# Patient Record
Sex: FEMALE | Race: WHITE | Employment: STUDENT | URBAN - METROPOLITAN AREA
[De-identification: names, ages, dates, MRNs, and addresses within clinical notes are randomized per-mention and may not be internally consistent; named-entity substitution may affect disease eponyms.]

---

## 2022-09-08 ENCOUNTER — OFFICE VISIT (OUTPATIENT)
Dept: ORTHOPEDIC SURGERY | Age: 22
End: 2022-09-08
Payer: COMMERCIAL

## 2022-09-08 DIAGNOSIS — M25.551 PAIN IN RIGHT HIP: Primary | ICD-10-CM

## 2022-09-08 PROCEDURE — 99213 OFFICE O/P EST LOW 20 MIN: CPT | Performed by: ORTHOPAEDIC SURGERY

## 2022-09-08 RX ORDER — SPIRONOLACTONE 25 MG/1
TABLET ORAL
COMMUNITY
Start: 2022-08-11

## 2022-09-08 NOTE — PROGRESS NOTES
Name: Calos Jung  YOB: 2000  Gender: female  MRN: [id number]      CC: Follow-up (Right hip)       HPI: Calos Jung is a 25 y.o. female who returns for follow up on the right hip CANDELARIA and labral pathology. She is back to school at [employer] after being home this summer. She admits that she did not do very much rehab over the summer but has started to work harder at this since coming back to school. She has no problems with the hip during her activities of daily living. Physical Examination:  General: no acute distress  Lungs: breathing easily  CV: regular rhythm by pulse  Right Hip: Mild discomfort over the adductor. Mild discomfort at the extremes of FADIR impingement testing. Otherwise very mild pain with provocative maneuvers of her hip. Assessment:     ICD-10-CM    1. Pain in right hip  M25.551           Plan:   She is progressing very well but has not really stressed her hip with strenuous lacrosse activity yet I think it is okay to progress slowly into that as tolerated. If she has increased symptoms we consider an intra-articular injection for her subtle labral pathology was visualized on the MRI. As long as she has minimal symptoms she can participate as tolerated and follow-up with me as needed. Omer Mcgee MD, [address] [place] [address] and Sports Medicine

## 2023-09-25 ENCOUNTER — OFFICE VISIT (OUTPATIENT)
Dept: ORTHOPEDIC SURGERY | Age: 23
End: 2023-09-25
Payer: COMMERCIAL

## 2023-09-25 DIAGNOSIS — S73.191A TEAR OF RIGHT ACETABULAR LABRUM, INITIAL ENCOUNTER: Primary | ICD-10-CM

## 2023-09-25 DIAGNOSIS — M25.859 FEMORAL ACETABULAR IMPINGEMENT: ICD-10-CM

## 2023-09-25 DIAGNOSIS — M25.551 PAIN IN RIGHT HIP: ICD-10-CM

## 2023-09-25 PROCEDURE — 99213 OFFICE O/P EST LOW 20 MIN: CPT | Performed by: ORTHOPAEDIC SURGERY

## 2023-09-25 RX ORDER — DICLOFENAC SODIUM 75 MG/1
75 TABLET, DELAYED RELEASE ORAL 2 TIMES DAILY
Qty: 20 TABLET | Refills: 1 | Status: SHIPPED | OUTPATIENT
Start: 2023-09-25

## 2023-09-25 NOTE — PROGRESS NOTES
.    Name: Salena Gray  YOB: 2000  Gender: female  MRN: [id number]      CC: Hip Pain (R)       HPI: Salena Gray is a 26 y.o. female who returns for follow up on her R hip. She has had increased pain with the fall lacrosse season. Mostly in her groin with change of direction activities and high intensity activities. She has had to take a few days off due to this. Physical Examination:  General: no acute distress  Lungs: breathing easily  CV: regular rhythm by pulse  Right Hip: Pain with logroll internal/external rotation. Pain with Jeannette Necessary which is mild. Pain with FADIR impingement testing with 15 degrees internal rotation external to 40. Assessment:     ICD-10-CM    1. Tear of right acetabular labrum, initial encounter  S73.191A       2. Pain in right hip  M25.551       3. Femoral acetabular impingement  M25.859           Plan:   She continues to have labral signs on exam consistent with her impingement and labral pathology. We discussed management of this. I prescribed her diclofenac for the next 10 days. We also discussed the pros and cons of an intra-articular injection both for diagnostic and therapeutic perspective. We talked about I would not want to do more than 2 of these and her goal would be to play through the fall season as well as through the spring season and if surgery is indicated to have that after that. After thorough discussion she would like to proceed with an intra-articular cortisone injection but wants to do this next week since she has multiple practices and games this week. We will see her back next week to perform the injection. Morelia Raymundo MD, [address] [facility] and Sports Medicine

## 2023-10-02 ENCOUNTER — OFFICE VISIT (OUTPATIENT)
Dept: ORTHOPEDIC SURGERY | Age: 23
End: 2023-10-02

## 2023-10-02 DIAGNOSIS — S73.191A TEAR OF RIGHT ACETABULAR LABRUM, INITIAL ENCOUNTER: Primary | ICD-10-CM

## 2023-10-02 RX ORDER — TRIAMCINOLONE ACETONIDE 40 MG/ML
40 INJECTION, SUSPENSION INTRA-ARTICULAR; INTRAMUSCULAR ONCE
Status: COMPLETED | OUTPATIENT
Start: 2023-10-02 | End: 2023-10-02

## 2023-10-02 RX ADMIN — TRIAMCINOLONE ACETONIDE 40 MG: 40 INJECTION, SUSPENSION INTRA-ARTICULAR; INTRAMUSCULAR at 16:22

## 2023-10-02 NOTE — PROGRESS NOTES
Name: Gianna Hernandez  YOB: 2000  Gender: female  MRN: [id number]      CC: Follow-up (injections) and Hip Pain (R)       HPI: Gianna Hernandez is a 26 y.o. female who returns for follow up on her R hip here today for an intra-articular injection. Physical Examination:  General: no acute distress  Lungs: breathing easily  CV: regular rhythm by pulse  Right Hip: Exam is unchanged pain with impingement testing. Assessment:     ICD-10-CM    1. Tear of right acetabular labrum, initial encounter  S73.191A triamcinolone acetonide (KENALOG-40) injection 40 mg     US ARTHR/ASP/INJ MAJOR JNT/BURSA RIGHT          Plan:   After verbal informed consent the Right hip was injected intra-articularly under ultrasound guidance with the curvilinear probe n the longitudinal axis from an anterolateral approach. 3 cc of 1% lidocaine was used in the skin down to the capsule until the capsule was seen to clearly distend with the needle in the femoral head neck junction off the articular surface. We then injected 4 cc of 0.25% Marcaine and 1 cc of 40 mg Kenalog intra-articularly the capsule was seen to clearly distend. The images were saved to the ultrasound machine. The patient tolerated the procedure well and was given postinjection flare precautions. Chas Jacinto MD, [address] [facility] and Sports Medicine